# Patient Record
Sex: MALE | Race: BLACK OR AFRICAN AMERICAN | NOT HISPANIC OR LATINO | Employment: FULL TIME | URBAN - METROPOLITAN AREA
[De-identification: names, ages, dates, MRNs, and addresses within clinical notes are randomized per-mention and may not be internally consistent; named-entity substitution may affect disease eponyms.]

---

## 2017-10-12 ENCOUNTER — OFFICE VISIT (OUTPATIENT)
Dept: RETAIL CLINIC | Facility: CLINIC | Age: 22
End: 2017-10-12

## 2017-10-12 DIAGNOSIS — Z02.83 ENCOUNTER FOR DRUG SCREENING: Primary | ICD-10-CM

## 2020-06-06 ENCOUNTER — APPOINTMENT (OUTPATIENT)
Dept: GENERAL RADIOLOGY | Facility: HOSPITAL | Age: 25
End: 2020-06-06

## 2020-06-06 ENCOUNTER — HOSPITAL ENCOUNTER (EMERGENCY)
Facility: HOSPITAL | Age: 25
Discharge: HOME | End: 2020-06-06
Attending: EMERGENCY MEDICINE | Admitting: EMERGENCY MEDICINE

## 2020-06-06 VITALS
RESPIRATION RATE: 16 BRPM | SYSTOLIC BLOOD PRESSURE: 105 MMHG | DIASTOLIC BLOOD PRESSURE: 69 MMHG | HEIGHT: 70 IN | TEMPERATURE: 97.6 F | WEIGHT: 170 LBS | BODY MASS INDEX: 24.34 KG/M2 | HEART RATE: 64 BPM | OXYGEN SATURATION: 98 %

## 2020-06-06 DIAGNOSIS — S93.402A SPRAIN OF LEFT ANKLE, UNSPECIFIED LIGAMENT, INITIAL ENCOUNTER: Primary | ICD-10-CM

## 2020-06-06 PROCEDURE — 73590 X-RAY EXAM OF LOWER LEG: CPT

## 2020-06-06 PROCEDURE — 73610 X-RAY EXAM OF ANKLE: CPT

## 2020-06-06 PROCEDURE — 99283 EMERGENCY DEPT VISIT LOW MDM: CPT

## 2020-06-06 PROCEDURE — 99282 EMERGENCY DEPT VISIT SF MDM: CPT | Performed by: EMERGENCY MEDICINE

## 2020-06-06 NOTE — DISCHARGE INSTRUCTIONS
Rest, ice, elevate your injured ankle as needed.  Use crutches to help with walking whenever needed.  Please follow-up in the orthopedics office if your ankle is not feeling better after several days.  Please return to the emergency room for any worsening pain, weakness, swelling, numbness or any other concerns.
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
## 2020-06-06 NOTE — ED NOTES
RN assessed pt's left lower extremity, shin area with slight bruising and swelling noted .     Anup Funk, RN  06/06/20 0893

## 2020-06-10 ENCOUNTER — OFFICE VISIT (OUTPATIENT)
Dept: ORTHOPEDIC SURGERY | Facility: CLINIC | Age: 25
End: 2020-06-10

## 2020-06-10 VITALS
HEART RATE: 70 BPM | WEIGHT: 170 LBS | BODY MASS INDEX: 24.34 KG/M2 | DIASTOLIC BLOOD PRESSURE: 66 MMHG | HEIGHT: 70 IN | SYSTOLIC BLOOD PRESSURE: 113 MMHG

## 2020-06-10 DIAGNOSIS — R52 PAIN: Primary | ICD-10-CM

## 2020-06-10 DIAGNOSIS — S80.12XA CONTUSION OF LEFT LOWER EXTREMITY, INITIAL ENCOUNTER: ICD-10-CM

## 2020-06-10 PROCEDURE — 73562 X-RAY EXAM OF KNEE 3: CPT | Performed by: ORTHOPAEDIC SURGERY

## 2020-06-10 PROCEDURE — 99203 OFFICE O/P NEW LOW 30 MIN: CPT | Performed by: ORTHOPAEDIC SURGERY

## 2020-06-10 RX ORDER — METHYLPREDNISOLONE 4 MG/1
TABLET ORAL
Qty: 21 TABLET | Refills: 0 | Status: SHIPPED | OUTPATIENT
Start: 2020-06-10 | End: 2020-06-22 | Stop reason: ALTCHOICE

## 2020-06-10 RX ORDER — MELOXICAM 7.5 MG/1
7.5 TABLET ORAL DAILY
Qty: 30 TABLET | Refills: 5 | Status: SHIPPED | OUTPATIENT
Start: 2020-06-10 | End: 2021-12-29
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
## 2020-06-22 ENCOUNTER — OFFICE VISIT (OUTPATIENT)
Dept: ORTHOPEDIC SURGERY | Facility: CLINIC | Age: 25
End: 2020-06-22

## 2020-06-22 VITALS — HEIGHT: 70 IN | BODY MASS INDEX: 24.34 KG/M2 | WEIGHT: 170 LBS

## 2020-06-22 DIAGNOSIS — S80.12XA CONTUSION OF LEFT LOWER EXTREMITY, INITIAL ENCOUNTER: ICD-10-CM

## 2020-06-22 DIAGNOSIS — R52 PAIN: Primary | ICD-10-CM

## 2020-06-22 PROCEDURE — 20610 DRAIN/INJ JOINT/BURSA W/O US: CPT | Performed by: ORTHOPAEDIC SURGERY

## 2020-06-22 PROCEDURE — 99213 OFFICE O/P EST LOW 20 MIN: CPT | Performed by: ORTHOPAEDIC SURGERY

## 2020-06-22 RX ORDER — LIDOCAINE HYDROCHLORIDE 10 MG/ML
4 INJECTION, SOLUTION EPIDURAL; INFILTRATION; INTRACAUDAL; PERINEURAL
Status: COMPLETED | OUTPATIENT
Start: 2020-06-22 | End: 2020-06-22

## 2020-06-22 RX ORDER — TRIAMCINOLONE ACETONIDE 40 MG/ML
40 INJECTION, SUSPENSION INTRA-ARTICULAR; INTRAMUSCULAR
Status: COMPLETED | OUTPATIENT
Start: 2020-06-22 | End: 2020-06-22

## 2020-06-22 RX ADMIN — LIDOCAINE HYDROCHLORIDE 4 ML: 10 INJECTION, SOLUTION EPIDURAL; INFILTRATION; INTRACAUDAL; PERINEURAL at 15:57

## 2020-06-22 RX ADMIN — TRIAMCINOLONE ACETONIDE 40 MG: 40 INJECTION, SUSPENSION INTRA-ARTICULAR; INTRAMUSCULAR at 15:57

## 2020-06-22 NOTE — PROGRESS NOTES
Subjective: Left knee pain     Patient ID: Von Melendez is a 24 y.o. male.    Chief Complaint:    History of Present Illness 24-year old male seen back on an urgent basis as he developed increasing pain in the left knee.  Had a conversation with the patient over the weekend states he felt numbness and pain in the lateral posterior aspect of the knee that would radiate down his leg.  Again noted no history of trauma.  Does work in the [employer] department does a lot of walking is noted there is increasing discomfort.  Was placed on meloxicam and steroid pack by me which has not really helped.       Social History     Occupational History   • Not on file   Tobacco Use   • Smoking status: Never Smoker   • Smokeless tobacco: Never Used   Substance and Sexual Activity   • Alcohol use: Yes     Frequency: Never     Comment: occ   • Drug use: No   • Sexual activity: Yes     Partners: Female      Review of Systems   Constitutional: Negative for chills, diaphoresis, fever and unexpected weight change.   HENT: Negative for hearing loss, nosebleeds, sore throat and tinnitus.    Eyes: Negative for pain and visual disturbance.   Respiratory: Negative for cough, shortness of breath and wheezing.    Cardiovascular: Negative for chest pain and palpitations.   Gastrointestinal: Negative for abdominal pain, diarrhea, nausea and vomiting.   Endocrine: Negative for cold intolerance, heat intolerance and polydipsia.   Genitourinary: Negative for difficulty urinating, dysuria and hematuria.   Musculoskeletal: Positive for joint swelling and myalgias. Negative for arthralgias.   Skin: Negative for rash and wound.   Allergic/Immunologic: Negative for environmental allergies.   Neurological: Negative for dizziness, syncope and numbness.   Hematological: Does not bruise/bleed easily.   Psychiatric/Behavioral: Negative for dysphoric mood and sleep disturbance. The patient is not nervous/anxious.          Past Medical History:   Diagnosis  Date   • Heart murmur      Past Surgical History:   Procedure Laterality Date   • TONSILLECTOMY       Family History   Problem Relation Age of Onset   • Hypertension Maternal Grandmother          Objective:  There were no vitals filed for this visit.      06/22/20  1544   Weight: 77.1 kg (170 lb)     Body mass index is 24.39 kg/m².        Ortho Exam   He is alert and oriented x3.  The knee shows no swelling effusion erythema and he is 0 to 130 degrees of motion with no patellofemoral crepitance but he does have some lateral joint line tenderness with an equivocal lateral oTdd's.  There is tenderness along the joint line and the MCL.  Quad and hamstring function remained 5/5 and the calf remains nontender.  Is good distal pulses there is no motor or sensory loss.  No pain to palpation over the fibular head with a negative Tinel's.  He has good capillary refill.  The skin is cool to touch.  Tolerating the meloxicam.    Assessment:        1. Pain    2. Contusion of left lower extremity, initial encounter           Plan: Spent 10 minutes with the patient reviewing his symptoms.  After reviewing treatment options of therapy versus injection regard to same with a cortisone injection of lidocaine and Kenalog and a ratio 4-1.  I would keep him off work I will see him back on July 8 and if not asymptomatic at that time able to return to work with no restrictions him to get an MRI of the knee.  Continue the meloxicam until seen.  Answered all questions      Large Joint Arthrocentesis: L knee  Date/Time: 6/22/2020 3:57 PM  Consent given by: patient  Site marked: site marked  Timeout: Immediately prior to procedure a time out was called to verify the correct patient, procedure, equipment, support staff and site/side marked as required   Supporting Documentation  Indications: pain   Procedure Details  Location: knee - L knee  Preparation: Patient was prepped and draped in the usual sterile fashion  Needle size: 22  G  Approach: superior  Medications administered: 4 mL lidocaine PF 1% 1 %; 40 mg triamcinolone acetonide 40 MG/ML  Patient tolerance: patient tolerated the procedure well with no immediate complications            Work Status:    CAM query complete.    Orders:  Orders Placed This Encounter   Procedures   • Large Joint Arthrocentesis: L knee       Medications:  No orders of the defined types were placed in this encounter.      Followup:  Return in about 16 days (around 7/8/2020).          Dictated utilizing Dragon dictation

## 2020-07-30 ENCOUNTER — OFFICE VISIT (OUTPATIENT)
Dept: ORTHOPEDIC SURGERY | Facility: CLINIC | Age: 25
End: 2020-07-30

## 2020-07-30 VITALS — BODY MASS INDEX: 24.34 KG/M2 | WEIGHT: 170 LBS | HEIGHT: 70 IN

## 2020-07-30 DIAGNOSIS — S80.12XA CONTUSION OF LEFT LOWER EXTREMITY, INITIAL ENCOUNTER: ICD-10-CM

## 2020-07-30 DIAGNOSIS — R52 PAIN: Primary | ICD-10-CM

## 2020-07-30 PROCEDURE — 99213 OFFICE O/P EST LOW 20 MIN: CPT | Performed by: ORTHOPAEDIC SURGERY
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
## 2020-07-31 ENCOUNTER — TELEPHONE (OUTPATIENT)
Dept: ORTHOPEDIC SURGERY | Facility: CLINIC | Age: 25
End: 2020-07-31

## 2025-07-14 ENCOUNTER — OFFICE VISIT (OUTPATIENT)
Dept: FAMILY MEDICINE CLINIC | Facility: CLINIC | Age: 30
End: 2025-07-14
Payer: COMMERCIAL

## 2025-07-14 VITALS
DIASTOLIC BLOOD PRESSURE: 61 MMHG | HEART RATE: 68 BPM | HEIGHT: 70 IN | WEIGHT: 177 LBS | SYSTOLIC BLOOD PRESSURE: 107 MMHG | BODY MASS INDEX: 25.34 KG/M2 | OXYGEN SATURATION: 97 % | TEMPERATURE: 98.4 F

## 2025-07-14 DIAGNOSIS — Z23 ENCOUNTER FOR IMMUNIZATION: ICD-10-CM

## 2025-07-14 DIAGNOSIS — F81.9 SPECIAL NEEDS DUE TO LEARNING DISABILITY: Primary | ICD-10-CM

## 2025-07-14 PROBLEM — R01.1 HEART MURMUR: Status: ACTIVE | Noted: 2025-07-14

## 2025-07-14 PROCEDURE — 90715 TDAP VACCINE 7 YRS/> IM: CPT | Performed by: FAMILY MEDICINE

## 2025-07-14 PROCEDURE — 99203 OFFICE O/P NEW LOW 30 MIN: CPT | Performed by: FAMILY MEDICINE

## 2025-07-14 PROCEDURE — 90471 IMMUNIZATION ADMIN: CPT | Performed by: FAMILY MEDICINE

## 2025-07-14 NOTE — LETTER
July 14, 2025     Patient: Von Melendez   YOB: 1995   Date of Visit: 7/14/2025       To Whom It May Concern:    Von Melendez reports history of learning disability for which he received an IEP during his educational years. He may require re-training on new or different tasks prior to their execution.           Sincerely,        Tamica Avila MD    CC: No Recipients

## 2025-07-14 NOTE — PROGRESS NOTES
"Chief Complaint  Establish Care    Subjective        Von Melendez presents to [facility]       The patient is a 29-year-old male who presents to the clinic to establish care.    He has been diagnosed with a learning disability, specifically MMD, and requires documentation for his employer. His employer has requested a doctor's note confirming his condition. He was previously on an Individualized Education Program (IEP) during his school years. He does not have access to his school records at present but plans to obtain them from the Department of Education. He is not currently on any daily medications. 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    He is due for a tetanus booster and is willing to receive it today. He is sexually active with female partners and does not wish to undergo STD testing at this time.    He had a heart murmur when he was younger and was told he might grow out of it.    SOCIAL HISTORY  He does not smoke or vape. He drinks alcohol intermittently, not very often. He does not use recreational substances.    FAMILY HISTORY  His mother and grandmother have high blood pressure. His mother also has COPD due to years of smoking.    ALLERGIES  He is allergic to TETRACYCLINE ANTIBIOTICS like DOXYCYCLINE, which cause his throat to swell up.    IMMUNIZATIONS  He is due for a tetanus booster.    History of Present Illness    Objective   Vital Signs:  /61   Pulse 68   Temp 98.4 °F (36.9 °C)   Ht 177.8 cm (70\")   Wt 80.3 kg (177 lb)   SpO2 97%   BMI 25.40 kg/m²   Estimated body mass index is 25.4 kg/m² as calculated from the following:    Height as of this encounter: 177.8 cm (70\").    Weight as of this encounter: 80.3 kg (177 lb).          Physical Exam  Constitutional:       Appearance: Normal appearance.   HENT:      Head: Normocephalic and atraumatic.      Nose: Nose normal.      Mouth/Throat:      Mouth: Mucous " membranes are moist.   Eyes:      General: Lids are normal.      Conjunctiva/sclera: Conjunctivae normal.   Cardiovascular:      Rate and Rhythm: Normal rate and regular rhythm.      Heart sounds: Normal heart sounds.   Pulmonary:      Effort: Pulmonary effort is normal.      Breath sounds: Normal breath sounds.   Musculoskeletal:      Cervical back: Normal range of motion.   Skin:     General: Skin is warm and dry.   Neurological:      General: No focal deficit present.      Mental Status: He is alert and oriented to person, place, and time.      Gait: Gait is intact.   Psychiatric:         Mood and Affect: Mood normal.         Behavior: Behavior normal.         Thought Content: Thought content normal.        Result Review :               Results         Assessment and Plan        1. Learning disability.  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 A letter will be provided today stating his reported history of a learning disability. Recommendations for accommodation include retraining if he is expected to perform new or different tasks. Once documentation from the school system is obtained, further steps will be taken. If the school documentation can not be obtained, a referral for neuropsychological testing will be made.    2. Health maintenance.  He is due for a tetanus booster. The tetanus booster will be administered today, providing protection for the next 10 years. He is advised to request records from his previous physical, including cholesterol screening and metabolic panel, and provide them to the clinic.     3. Heart murmur.  He had a heart murmur when he was younger and was told he might grow out of it. It might come back depending on how hydrated he is and how much caffeine he has, but no heart murmur detected today.    Diagnoses and all orders for this visit:    1. Special needs due to learning disability  (Primary)    2. Encounter for immunization  -     Tdap Vaccine => 8yo IM (BOOSTRIX/ADACEL)             Follow Up   No follow-ups on file.  Patient was given instructions and counseling regarding his condition or for health maintenance advice. Please see specific information pulled into the AVS if appropriate.     Patient or patient representative verbalized consent for the use of Ambient Listening during the visit with  Tamica Avila MD for chart documentation. 7/14/2025  13:37 EDT

## 2025-07-14 NOTE — PATIENT INSTRUCTIONS
Please provide record from school district. If you can't obtain it I will put in a neuropsych referral.

## 2025-07-18 ENCOUNTER — PATIENT ROUNDING (BHMG ONLY) (OUTPATIENT)
Dept: FAMILY MEDICINE CLINIC | Facility: CLINIC | Age: 30
End: 2025-07-18
Payer: COMMERCIAL

## 2025-07-18 NOTE — PROGRESS NOTES
Good afternoon Mr. Melendez,    My name is Mara, I am a medical assistant here at Dr. Avila's office. We hope your recent visit with us went smoothly.     If you have any questions or concerns, please feel free to reach out at [phone]. Please take the time to fill out the survey that will be sent to you to help us better your care. Thank you and have a great day!     HUNG Terry